# Patient Record
Sex: FEMALE | Race: WHITE | NOT HISPANIC OR LATINO | Employment: FULL TIME | URBAN - METROPOLITAN AREA
[De-identification: names, ages, dates, MRNs, and addresses within clinical notes are randomized per-mention and may not be internally consistent; named-entity substitution may affect disease eponyms.]

---

## 2024-01-08 ENCOUNTER — ANNUAL EXAM (OUTPATIENT)
Dept: OBGYN CLINIC | Facility: MEDICAL CENTER | Age: 24
End: 2024-01-08
Payer: COMMERCIAL

## 2024-01-08 VITALS
HEIGHT: 62 IN | BODY MASS INDEX: 37.91 KG/M2 | DIASTOLIC BLOOD PRESSURE: 70 MMHG | SYSTOLIC BLOOD PRESSURE: 110 MMHG | WEIGHT: 206 LBS

## 2024-01-08 DIAGNOSIS — Z36.89 ESTABLISH GESTATIONAL AGE, ULTRASOUND: ICD-10-CM

## 2024-01-08 DIAGNOSIS — Z3A.01 LESS THAN 8 WEEKS GESTATION OF PREGNANCY: ICD-10-CM

## 2024-01-08 DIAGNOSIS — N91.2 AMENORRHEA: Primary | ICD-10-CM

## 2024-01-08 PROBLEM — Z87.59 HISTORY OF GESTATIONAL HYPERTENSION: Status: ACTIVE | Noted: 2023-08-01

## 2024-01-08 PROCEDURE — 76817 TRANSVAGINAL US OBSTETRIC: CPT | Performed by: CLINICAL NURSE SPECIALIST

## 2024-01-08 PROCEDURE — 99213 OFFICE O/P EST LOW 20 MIN: CPT | Performed by: CLINICAL NURSE SPECIALIST

## 2024-01-08 NOTE — ASSESSMENT & PLAN NOTE
Unplanned pregnancy, but welcome  Short interval. Last delivery 10/14/23  Unknown LMP.  US today is showing a SLIUP measuring 6w2d.    Will repeat US in 2 wks to confirm EDC.  SAB precautions given

## 2024-01-08 NOTE — PROGRESS NOTES
"   Subjective  Patient ID: Sheeba Fung is a 23 y.o. female here for Gynecologic Exam (Pap 3/21/22 neg/Birth control/)  Pt originally scheduled for YV however when discussing her menstrual cycle she reports no menses since delivery in October.  Was breastfeeding but has stopped ~.  Has been having frequent unprotected sex and he doesn't always w/d.  She took a UPT which was positive around .  Visit changed to US visit.    She is still taking a prenatal vitamin    She is C/O amenorrhea  Signs and symptoms of pregnancy:   Breast tenderness: no  Fatigue: yes  Cramping or Pelvic Pain: no  Spotting or Vaginal Bleeding: no  Nausea or vomiting: no    OB History    Para Term  AB Living   4 2 2 0 2 2   SAB IAB Ectopic Multiple Live Births   1 0 0 0 2      # Outcome Date GA Lbr Jamie/2nd Weight Sex Delivery Anes PTL Lv   4 Term 10/14/23 37w4d / 00:16 3140 g (6 lb 14.8 oz) M Vag-Spont EPI N PELON   3 SAB 2022           2 Term 19 39w0d  3657 g (8 lb 1 oz) F Vag-Spont EPI N PELON      Complications: PROM (premature rupture of membranes)   1 2017                The following portions of the patient's history were reviewed and updated as appropriate: allergies, current medications, past family history, past medical history, past social history, past surgical history, and problem list.    Perinent hx that may affect pregnancy:  Short interval  Hx of GHTN      Review of Systems    See HPI for pertinent positives.             /70 (BP Location: Left arm, Patient Position: Sitting, Cuff Size: Standard)   Ht 5' 2\" (1.575 m)   Wt 93.4 kg (206 lb)   LMP  (LMP Unknown)   Breastfeeding No   BMI 37.68 kg/m²   OBGyn Exam      FIRST TRIMESTER OBSTETRIC ULTRASOUND     No LMP recorded (lmp unknown).    INDICATION: Establish Gestational Age; unknown LMP       FINDINGS:  See imaging report for details     Additional Findings: none     FHR: 116  IMPRESSION:    Single intrauterine pregnancy of 6 " weeks 2days gestational age  Fetal cardiac activity detected.  No adnexal masses seen.  EDC by LMP: n/a  EDC by this Ultrasound:  24    Assigning a Final GERARDO  Will be done by US, but presently too early to assign EDC. Will repeat US in 2 wks and officially confirm EDC then.    RONA Tsang   CENTER -[facility] [place]  [address]    [place] [address]  Dept: [phone]  Dept Fax: [phone]  Loc: [phone]  Loc Fax: [phone]  Ultrasound Probe Disinfection    A transvaginal ultrasound was performed.   Prior to use, disinfection was performed with High Level Disinfection Process ([employer]).  Probe serial number: [id number] was used.                    Assessment/Plan:      1. Amenorrhea  -     AMB US OB < 14 weeks single or first gestation level 1    2. Establish gestational age, ultrasound  -     AMB US OB < 14 weeks single or first gestation level 1    3. Less than 8 weeks gestation of pregnancy  Assessment & Plan:  Unplanned pregnancy, but welcome  Short interval. Last delivery 10/14/23  Unknown LMP.  US today is showing a SLIUP measuring 6w2d.    Will repeat US in 2 wks to confirm EDC.  SAB precautions given           Orders Placed This Encounter   Procedures    AMB US OB < 14 weeks single or first gestation level 1

## 2024-01-24 ENCOUNTER — ULTRASOUND (OUTPATIENT)
Dept: OBGYN CLINIC | Facility: MEDICAL CENTER | Age: 24
End: 2024-01-24
Payer: COMMERCIAL

## 2024-01-24 VITALS
BODY MASS INDEX: 38.64 KG/M2 | WEIGHT: 210 LBS | DIASTOLIC BLOOD PRESSURE: 80 MMHG | SYSTOLIC BLOOD PRESSURE: 114 MMHG | HEIGHT: 62 IN

## 2024-01-24 DIAGNOSIS — Z3A.08 8 WEEKS GESTATION OF PREGNANCY: ICD-10-CM

## 2024-01-24 DIAGNOSIS — N89.8 VAGINA ITCHING: ICD-10-CM

## 2024-01-24 DIAGNOSIS — O09.891 SHORT INTERVAL BETWEEN PREGNANCIES AFFECTING PREGNANCY IN FIRST TRIMESTER, ANTEPARTUM: ICD-10-CM

## 2024-01-24 DIAGNOSIS — Z87.59 HISTORY OF GESTATIONAL HYPERTENSION: ICD-10-CM

## 2024-01-24 DIAGNOSIS — Z36.89 ESTABLISH GESTATIONAL AGE, ULTRASOUND: Primary | ICD-10-CM

## 2024-01-24 LAB
BV WHIFF TEST VAG QL: NORMAL
CLUE CELLS SPEC QL WET PREP: NORMAL
PH SMN: 3.5 [PH]
SL AMB POCT WET MOUNT: NORMAL
T VAGINALIS VAG QL WET PREP: NORMAL
YEAST VAG QL WET PREP: NORMAL

## 2024-01-24 PROCEDURE — 87210 SMEAR WET MOUNT SALINE/INK: CPT | Performed by: CLINICAL NURSE SPECIALIST

## 2024-01-24 PROCEDURE — 76817 TRANSVAGINAL US OBSTETRIC: CPT | Performed by: CLINICAL NURSE SPECIALIST

## 2024-01-24 PROCEDURE — 99213 OFFICE O/P EST LOW 20 MIN: CPT | Performed by: CLINICAL NURSE SPECIALIST

## 2024-01-24 NOTE — PROGRESS NOTES
"   Subjective  Patient ID: Sheeba Kirkland is a 23 y.o. female here for Pregnancy Ultrasound (Repeat ultrasound /Patient said she feels good just tired and hunger /Patient is not breast feeding/Pregnancy is unplanned she is here with her partner today )    Newly Pregnant  No LMP recorded (lmp unknown). Patient is pregnant. Recent delivery 10/14/23- no menses since.  US previously done on 21 and was found to have a SLIUP at 6w2d, should be around 8w4d.     + c/o of that feeling hungry all the time- but no n/v  No cramping or bldg  + c/o intermittent vaginal itching    OB History    Para Term  AB Living   5 2 2 0 2 2   SAB IAB Ectopic Multiple Live Births   1 0 0 0 2      # Outcome Date GA Lbr Jamie/2nd Weight Sex Delivery Anes PTL Lv   5 Current            4 Term 10/14/23 37w4d / 00:16 3140 g (6 lb 14.8 oz) M Vag-Spont EPI N PELON   3 SAB 2022           2 Term 19 39w0d  3657 g (8 lb 1 oz) F Vag-Spont EPI N PELON      Complications: PROM (premature rupture of membranes)   1 AB                 The following portions of the patient's history were reviewed and updated as appropriate: allergies, current medications, past family history, past medical history, past social history, past surgical history, and problem list.    Perinent hx that may affect pregnancy:  Short interval pregnancy  Hx of GHTN       Review of Systems    See HPI for pertinent positives.             /80 (BP Location: Left arm, Patient Position: Sitting, Cuff Size: Standard)   Ht 5' 2\" (1.575 m)   Wt 95.3 kg (210 lb)   LMP  (LMP Unknown)   BMI 38.41 kg/m²   OBGyn Exam      FIRST TRIMESTER OBSTETRIC ULTRASOUND     No LMP recorded (lmp unknown). Patient is pregnant.    INDICATION: Establish Gestational Age       FINDINGS:  See imaging report for details     Additional Findings: none     FHR: 155  IMPRESSION:    Single intrauterine pregnancy of 8 weeks 6days gestational age  Fetal cardiac activity " detected.  No adnexal masses seen.  EDC by LMP: N/A  EDC by this Ultrasound: 24    Assigning a Final GERARDO  Please choose how you are assigning the GERARDO: The LMP is unknown or uncertain, therefore the final GERARDO will be based on today's ultrasound    Final GERARDO: 24 by ultrasound at this encounter.    RONA Tsang   CENTER -[facility] [place] [facility] [place]  [address]    [place] [address]  Dept: [phone]  Dept Fax: [phone]  Loc: [phone]  Loc Fax: [phone]  Ultrasound Probe Disinfection    A transvaginal ultrasound was performed.   Prior to use, disinfection was performed with High Level Disinfection Process ("[employer]on).  Probe serial number: [id number] was used.                    Assessment/Plan:         1. Establish gestational age, ultrasound  -     AMB US OB < 14 weeks single or first gestation level 1    2. 8 weeks gestation of pregnancy  Assessment & Plan:  She is a  with No LMP recorded (lmp unknown). Patient is pregnant. Ultrasound today is showing a viable IUP of 8w 6d.  Using EDC by US - 24.  Referral to [facility] given for routine US. F/U for OB intake and then Initial Prenatal Exam.        3. Short interval between pregnancies affecting pregnancy in first trimester, antepartum    4. History of gestational hypertension    5. Vagina itching  Comments:  wet mount normal. no yeast, BV or trich  Orders:  -     POCT wet mount        Orders Placed This Encounter   Procedures    POCT wet mount    AMB US OB < 14 weeks single or first gestation level 1

## 2024-01-24 NOTE — PATIENT INSTRUCTIONS
"Again, congratulations on your pregnancy!  NEXT STEPS  Get Prenatal bloodwork  Call MFM to schedule next ultrasound (done 12-13 wks)   Contact information for [facility] ([facility])- Main Number [phone]   Return to our office in 1-2 weeks for an OB intake and initial prenatal Exam(or sooner if any problems/concerns arise- see packet for things to report)  Check out [facility] website to read the \"Pregnancy Essentials Guide\" -this has lots of great early pregnancy information     It can be found by going to [email or web address]-->select services-->select women's health-->select Obstetrics  [email or web address]    Below is a variety of information that is useful in early pregnancy   Genetic screening can be very confusing for most people   We do recommend genetic screening universally for all pregnant women. Our goal is to have the most healthy uncomplicated pregnancy possible and this is one way to identify possible complications early.  Common disorders we can screen for include: Down Syndrome, Neural tube defects ( like spina bifida or gastroschisis) and trisomy 13, even possibly more  There are several options we will talk more about. Below is some information to get you started thinking about this.  We will discuss this more at the next appt.     GUIDE TO GENETIC TESTING    Aneuploidy Testing  Trisomy 21 (Down Syndrome), Trisomy 18, and Open Neural Tube Defects (Spina Bifida).  You may choose one of the following options: See below for CPT/Diagnostic codes  NIPT (Non-Invasive Prenatal Testing or Cell Free- Fetal DNA): This simple and accurate non-invasive prenatal screening blood test offers results for early risk assessment of Down syndrome (Trisomy 21), or Trisomy 18, trisomy 13 and other aneuploidy conditions.  The test also offers an optional analysis for fetal sex.  The test analyzes the relative amount of 21, 18, 13; X and Y chromosome " material in circulating cell-free DNA from a maternal blood sample.  This test can be performed at any time after 10 weeks gestation.  If you elect this test, you will also have an AFP (alpha-fetoprotein) blood test to test for open neural tube defects.  Recommended follow up to a positive result is genetic counseling and prenatal diagnosis.     Sequential Screening with Nuchal Translucency: This is a two-step test to detect whether a fetus is at increased risk for trisomy 21, trisomy 18, trisomy 13 and open neural tube defects.  The test has a narrow window for testing (the first step must be performed between 10 and 13 weeks gestation).  It includes two blood draws and an ultrasound.  The ultrasound measures the amount of fluid behind the baby’s neck called the nuchal translucency (NT).  The blood tests measures three different maternal hormone levels, -- pregnancy associated plasma protein (YUNIER-A), human chorionic gonadotrophin (hCG), and dimeric inhibin A (GARTH).  These measurements in combination with some maternal information such as height and weight are used to calculate whether the baby is at increased risk for Down Syndrome or Trisomy 18.  An alpha-fetoprotein test (AFP) is also included to test for open neural tube defects.  Recommended follow up to a positive result is additional testing that is more definitive, and referral for genetic counseling and prenatal diagnosis.    Quad Screen: This test is also known as the quadruple marker test.  It is a test that measures levels of four substances in a pregnant woman’s blood--Alpha-fetoprotein (AFP), a protein made by the developing baby; Human chorionic gonadotropin (hCG), a hormone made by the placenta; Estriol, a hormone made by the placenta and the baby’s liver; and Inhibin A, another hormone made by the placenta.  Typically, the quad screen is done between weeks 15 and 20 of pregnancy--the second trimester and the results indicate whether the baby is at  higher risk for Down Syndrome (Trisomy 21), Trisomy 18, and open neural tube defects.  This is a screening test.  Recommended follow up to a positive result is additional testing that is more definitive, as well as referral for genetic counseling and prenatal diagnosis.        Trisomy 21 Trisomy 18 Trisomy 13   NIPT  (FPR 0.1%) <99% <98% 99%   Sequential Screening (FPR 3.5%) 92% 90% N/A   Quad Screen   (FPR 5%) 83% 80% N/A   (FPR is False Positive Rate)       Additional Screening Tests Offered  Cystic Fibrosis: Cystic Fibrosis is the most common inherited disease of children and young adults.  The carrier frequency is 1 in 24, to 1 in 97.  Both parents need to be carriers for a child to be affected (25% chance).  One in 3500, children born are affected.  Cystic fibrosis is a disorder of mucus production and produces abnormally thick mucus leading to life threatening lung infections, digestion problems, poor growth, infertility, and more.  Symptoms range from mild to severe, but individuals with severe disease may die in childhood.  With treatments today, people with Cystic Fibrosis can live into their 30’s.  CF does not affect intelligence.  Recommended follow up to a positive result is testing of the baby’s father.  Spinal Muscular Atrophy (SMA): SMA is the most common inherited cause of early childhood death.  The carrier frequency is 1 in 47 to 1 in 72 in the [place] and both parents need to be carriers for a child to be affected (25% chance).  1 in 11,000 children are affected.  SMA is a progressive degeneration of lower motor neurons.  Muscle weakness is the most common type with respiratory failure by the age of 2 years old.  Muscles responsible for crawling, walking, swallowing, and head and neck control are most severely affected.  Recommended follow up to a positive result is testing of the baby’s father.      Fragile X Syndrome (the most common inherited cause of developmental delays): Fragile X syndrome is an  “X-linked” genetic disease, which means it is only carried by the mom.  Unfortunately, 1 in 250 females are carriers and a child has a 50% chance of being affected if this is the case.  1 in 4000 boys is affected with Fragile X and 1 in 8000 girls is affected.  Approximately 1/3 of all children born with Fragile X also have autism and hyperactivity.  Recommended follow up to a positive result is genetic counseling and prenatal diagnosis.      Guide To Insurance Coverage For Genetic Screening  Due to the complexity of coverage guidelines, we are unable to quote benefits or guarantee insurance coverage for any of these tests.  Insurance benefits are plan-specific and offer vastly different coverage based on your policy.  The handout attached explains the benefits to each test, and also provides the billing (CPT) codes for each test.  Even if the testing is covered, it could be applied to any unmet deductibles, and copays may apply, resulting in a bill.  You are encouraged to contact your insurance company to obtain your benefits based on your age and risk factors, so that there are no surprises.      Test Insurance Procedure (CPT) code   NIPT-cell free fetal DNA Most accurate non-invasive test- not always covered w/o risk factors 07321   Sequential Screen w/ NT US Current standard test-should be covered Part 1: (if lab is [facility]) 74535,36238   (If lab is [facility]) 58539  Part 2: (if lab is [facility])97138,11417,56767, 73941  (If lab is [facility]) 78909   Quad screen Old standard-use if past 1st trimester 03842, 43375, 96357, 92133   CF- Cystic Fibrosis  65793   SMA- Spinal Musc. Atrophy  61086   Fragile X  05401       Diagnosis code used Varies based on history- But will be one of these:  Encounter for  screening for other genetic defect Z36.8  Advanced Maternal Age (over 35) O09.529  Family History of Genetic Disease Carrier Z84.81    Please contact your insurance company with the appropriate CPT code from the  attached list, and diagnosis code applicable to your situation.    We ask that you review this information and decide what testing you would like to have performed.  Please note that the Sequential Screening with Nuchal Translucency has a smaller window of time to be performed during pregnancy (Prior to 14 wks).        Warning Signs During Pregnancy  The list below includes warning signs your providers would like you to be aware of.  If you experience any of these at any time during your pregnancy, please call us as soon as possible.     Vaginal bleeding   Sharp abdominal pain that does not go away   Fever (more than 100.4?F and is not relieved with Tylenol)   Persistent vomiting lasting greater than 24 hours   Chest pain   Pain or burning when you urinate   Severe headache that doesn’t resolve with Tylenol   Blurred vision or seeing spots in your vision   Sudden swelling of your face or hands   Redness, swelling or pain in a leg   A sudden weight gain in just a few days   Decrease in your baby’s movements (after 28 weeks or the 6th month of pregnancy)   A loss of watery fluid from your vagina - can be a gush, a trickle or  continuous wetness   After 20 weeks of pregnancy, rhythmic cramping (greater than 4 per hour)  or menstrual like low/pelvic pain    Call the OFFICE [phone] for any questions/emergencies.  At night or on the weekend, please indicate it is an emergency and the DOCTOR on call will be paged.    Discomforts of Early Pregnancy    Tips for coping with nausea and vomiting during pregnancy   Eat meals and snacks slowly   Eat every 1-2 hours to avoid a full stomach   Don’t skip meals, avoid empty stomach   Eat a snack prior to getting out of bed   Avoid food and beverages with a strong aroma   Avoid dehydration - drink enough fluid to keep the urine pale yellow   Drink fluids before a meal to minimize the effect of a full stomach   Limit the amount of coffee and beverages that contain caffeine    Eliminate spicy, odorous, high fat (fried foods), acidic (tomato products) and sweet foods   Fluids that contain lemon (lemonade), mint (tea) or orange can usually be well tolerated   Snacks and meals that contain low-fat protein (lean meats, fish, poultry and eggs) along with eating easily digestible carbs (fruit, rice, toast, crackers and dry cereal) may be tolerated better   Foods with ginger may be well tolerated. May use ginger root powder, capsules or extract (up to 1000 mg per day)   Drink liquids in small amounts    If symptoms persist, please contact your provider.      Tips for coping with constipation during pregnancy   Increase fiber and fluids.  - Drink 8-10 cups of liquid, like water or low-sugar juice daily  - Keep urine pale with fluids (water, milk), fruit and vegetables   Eat a well-balanced diet that contains high fiber food (fruits, vegetables, whole grain breads and cereals, bran and dried beans)   Take a 30-minute walk daily   You may take a mild stool softener such as Colace®    If symptoms persist, please contact your provider.      For any emergencies, PLEASE CALL THE OFFICE at [phone]. If the office is closed, the doctor on call will be paged by the answering service.    Medications and Pregnancy- see Pregnancy Essentials guide online- page 9    Expected Weight Gain During Pregnancy  If you have a healthy BMI (18-25) prior to pregnancy:  The recommended weight gain is between 25-35 pounds. Approximate weight gain  in the first trimester is 1-4.5 pounds. An expected weight gain during the second and  third trimester is approximately one pound per week.  If you have a BMI of less than 18 prior to pregnancy,  you are considered underweight:  The recommended weight gain is between 28-40 pounds. Approximate weight gain  in the first trimester is 1-4.5 pounds. An expected weight gain during the second and  third trimester is just over one pound per week.  If your BMI is 25 to 29.9: you are  considered overweight:  You should gain 1/2 to 2/3 pound during the second and third trimester, for a total  weight gain of 15 to 25 pounds.  If you have a BMI of greater than 30 prior to pregnancy,  you are considered overweight:  The recommended weight gain is between 15-25 pounds. Approximate weight gain  in the first trimester is 1-4.5 pounds. An expected weight gain during the second  and third trimester is approximately 0.5 pound per week.    Foods to avoid during pregnancy:   Unpasteurized milk, juice and cheese  - Soft cheeses like feta or brie (if made with UNPASTEURIZED milk)   Unheated deli meats like lunchmeat and hotdogs   Undercooked poultry, beef, pork, seafood including raw sushi    What fish is safe to eat during pregnancy?  Eat 8 to 12 ounces of fish a week. Pick from this group frequently, especially if you follow  the American Heart Association’s recommendation to eat fish at least 2 times a week.    AVOID HIGH MERCURY FISH  A single meal of the following fish can put  you over the Environmental Protection  Agency’s safe limit for the month.  High mercury fish:  Shark  Swordfish  Cristofer Mackerel  Tile Fish    Caffeine and Pregnancy    The March of D[employer] and American College of Obstetrics and Gynecologists (ACOG) urge pregnant  women to limit their caffeine consumption to no more than 200 milligrams (mg) per day. This is  comparable to having one 12-ounce cup of coffee a day. This level has been shown not to increase risk  of miscarriage, growth or  labor complications. Effects of higher levels are not known.    Exercise During Pregnancy  A daily exercise program that consists of 30 minutes a day is recommended.   Low impact exercises like walking and swimming are great exercises throughout  all of pregnancy   If you’re an avid strength  avoid lifting very heavy weights - nothing more  than 30 pounds    Drink plenty of fluids while exercising to stay hydrated.  Be careful to avoid  overheating.    ACTIVITIES TO AVOID   Exercises that can make you lose your balance.   Activities that can put your baby at risk i.e. horseback riding, scuba diving, skiing  or snowboarding. Any other sport that puts you at risk for getting hit in the  abdominal area.   Do not use saunas, steam rooms or hot tubs (that have a higher temperature  than 100F)   After the first trimester, avoid exercises that require you to lay flat on your back.   Avoid exceeding a heart rate greater than 140 beats per minute. As long as you are  able to hold a conversation while exercising your heart rate is likely acceptable

## 2024-01-24 NOTE — ASSESSMENT & PLAN NOTE
She is a  with No LMP recorded (lmp unknown). Patient is pregnant. Ultrasound today is showing a viable IUP of 8w 6d.  Using EDC by US - 24.  Referral to [facility] given for routine US. F/U for OB intake and then Initial Prenatal Exam.

## 2024-01-25 ENCOUNTER — TELEPHONE (OUTPATIENT)
Dept: PERINATAL CARE | Facility: OTHER | Age: 24
End: 2024-01-25

## 2024-01-25 NOTE — TELEPHONE ENCOUNTER
Called patient to schedule MFM appointment, based on referral issued to Maternal Fetal Medicine by OB office.  Left voicemail requesting patient to call back and schedule appointment, with office number for return call [phone].

## 2024-02-09 ENCOUNTER — INITIAL PRENATAL (OUTPATIENT)
Dept: OBGYN CLINIC | Facility: CLINIC | Age: 24
End: 2024-02-09

## 2024-02-09 ENCOUNTER — TELEPHONE (OUTPATIENT)
Dept: OBGYN CLINIC | Facility: CLINIC | Age: 24
End: 2024-02-09

## 2024-02-09 VITALS — WEIGHT: 210 LBS | BODY MASS INDEX: 38.64 KG/M2 | HEIGHT: 62 IN

## 2024-02-09 DIAGNOSIS — Z34.81 PRENATAL CARE, SUBSEQUENT PREGNANCY, FIRST TRIMESTER: Primary | ICD-10-CM

## 2024-02-09 PROCEDURE — OBC

## 2024-02-09 NOTE — PROGRESS NOTES
OB INTAKE INTERVIEW  Patient is 23 y.o.y.o. who presents for OB intake at 11 wks  She is accompanied by herself during this encounter  The father of her baby (Kodak Kirkland) is involved in the pregnancy and is 26 years old.      Last Menstrual Period: unknown delivered 10/2023- no menses   Ultrasound: Measured 8 weeks 6 days on 24  Estimated Date of Delivery: 24 changed by 8 week US    Signs/Symptoms of Pregnancy  Current pregnancy symptoms: feels great! Increased appetite  Constipation YES, no interventions needed  Headaches no  Cramping/spotting no  PICA cravings no    Diabetes-  Body mass index is 38.41 kg/m².  If patient has 1 or more, please order early 1 hour GTT  History of GDM no  BMI >35 YES  History of PCOS or current metformin use no  History of LGA/macrosomic infant (4000g/9lbs) no    If patient has 2 or more, please order early 1 hour GTT  BMI>30 YES  AMA no  First degree relative with type 2 diabetes no  History of chronic HTN, hyperlipidemia, elevated A1C no  High risk race (, , ,  or ) no    Hypertension- if you answer yes to any of the following, please order baseline preeclampsia labs (cbc, comprehensive metabolic panel, urine protein creatinine ratio, uric acid)  History of of chronic HTN no  History of gestational HTN YES  History of preeclampsia, eclampsia, or HELLP syndrome no  History of diabetes no  History of lupus, autoimmune disease, kidney disease no    Thyroid- if yes order TSH with reflex T4  History of thyroid disease no    Bleeding Disorder or Hx of DVT-patient or first degree relative with history of. Order the following if not done previously.   (Factor V, antithrombin III, prothrombin gene mutation, protein C and S Ag, lupus anticoagulant, anticardiolipin, beta-2 glycoprotein)   no    OB/GYN-  History of abnormal pap smear no       Date of last pap smear 3/21/22  History of HPV no  History of Herpes/HSV  no  History of other STI (gonorrhea, chlamydia, trich) no  History of prior  YES  History of prior  no  History of  delivery prior to 36 weeks 6 days no  History of blood transfusion no  Ok for blood transfusion yes    Substance screening-   History of tobacco use no  Currently using tobacco no  Substance Use Screen Level (N/A, LOW, HIGH) NA    MRSA Screening-   Does the pt have a hx of MRSA? no    Immunizations:  Influenza vaccine given this season no  Discussed Tdap vaccine yes  Discussed COVID Vaccine no    Genetic/MFM-  Do you or your partner have a history of any of the following in yourselves or first degree relatives?  Cystic fibrosis no  Spinal muscular atrophy no  Hemoglobinopathy/Sickle Cell/Thalassemia no  Fragile X Intellectual Disability no    If yes, discuss Carrier Screening and recommend consultation with M/Genetic Counseling and place specific [facility] Referral for.    If no, discuss Carrier Screening being completed once in a lifetime as a standard of care lab test. Place orders for Cystic Fibrosis Gene Test (CEX088) and Spinal Muscular Atrophy DNA ([id number])      Appointment for Nuchal Translucency Ultrasound at [facility] scheduled for       Interview education  [facility] Pregnancy Essentials Book reviewed, discussed and attached to their AVS yes    Nurse/Family Partnership- patient may qualify no; referral placed no    Prenatal lab work scripts yes  Extra labs ordered:  1 HR gtt  preE labs    Aspirin/Preeclampsia Screen    Risk Level Risk Factor Recommendation   LOW Prior Uncomplicated full-term delivery YES No Aspirin recommendation        MODERATE Nulliparity no Recommend low-dose aspirin if     BMI>30 YES 2 or more moderate risk factors    Family History Preeclampsia (mother/sister) no     35yr old or greater no      or Low Socioeconomic no     IVF Pregnancy  no     Personal History Risks (low birth weight, prior adverse preg outcome, >10yr preg interval) YES gHTN  with 2023 delivery        HIGH History of Preeclampsia no Recommend low-dose aspirin if     Multifetal gestation no 1 or more high risk factors    Chronic HTN no     Type 1 or 2 Diabetes no     Renal Disease no     Autoimmune Disease  no      Contraindications to ASA therapy:  NSAID/ ASA allergy: no  Nasal polyps: no  Asthma with history of ASA induced bronchospasm: no  Relative contraindications:  History of GI bleed: no  Active peptic ulcer disease: no  Severe hepatic dysfunction: no    Patient should be recommended to take ASA 162mg during this pregnancy from 12-36wks to lower her risk of preeclampsia: meets moderate risk criteria - patient aware of ASA therapy      The patient has a history now or in prior pregnancy notable for:  gestational HTN and Short interval pregnancy- delivered  Oct 2023      Details that I feel the provider should be aware of: This was a unplanned but welcomed pregnancy for Sheeba and Kodak, they had just delivered their 2nd child with us, Kvng in October of 2023. She had a 37w delivery due to developing gHTN. She has recently moved to [place] and plans on keeping her OB care here in [place]. She is aware of the distance between where she lives and [place] if it becomes to much she will consider transferring care to an OB in [place].  She and Kodak were just  in November. She is aware of 1 hr gtt testing, preE baseline labs, and pn1 labs. CF/SMA was not ordered as she believes there were done in 2019 with her 1st pregnancy. She will have records faxed from OB in [place], no records available via care everywhere.     PN1 visit scheduled. The patient was oriented to our practice, the navigator role, reviewed delivering physicians and [facility] for Delivery. All questions were answered.    Interviewed by: Jazmyn Kumar RN

## 2024-02-09 NOTE — TELEPHONE ENCOUNTER
Left message for patient regarding OB intake appointment. Direct line given to call back within 15 min to continue with intake appointment today, the office line was given if this appointment time no longer works and she needs it to be rescheduled.  LOVE sent to .

## 2024-02-09 NOTE — PATIENT INSTRUCTIONS
Congratulations!! Please review our Pregnancy Essential Guide and [facility] L&D Virtual tour from our networks website.     [facility] Pregnancy Essentials Guide  [facility] Women's Health ([email or web address])     [facility] - Virtual Tour ([facility])     Click for Lab Locations: [facility] ([email or web address])

## 2024-02-19 ENCOUNTER — TELEPHONE (OUTPATIENT)
Facility: HOSPITAL | Age: 24
End: 2024-02-19

## 2024-02-19 ENCOUNTER — ROUTINE PRENATAL (OUTPATIENT)
Facility: HOSPITAL | Age: 24
End: 2024-02-19
Payer: COMMERCIAL

## 2024-02-19 ENCOUNTER — APPOINTMENT (OUTPATIENT)
Dept: LAB | Facility: CLINIC | Age: 24
End: 2024-02-19
Payer: COMMERCIAL

## 2024-02-19 VITALS
WEIGHT: 217.8 LBS | BODY MASS INDEX: 41.12 KG/M2 | DIASTOLIC BLOOD PRESSURE: 80 MMHG | SYSTOLIC BLOOD PRESSURE: 130 MMHG | HEIGHT: 61 IN | HEART RATE: 76 BPM

## 2024-02-19 DIAGNOSIS — Z36.82 ENCOUNTER FOR ANTENATAL SCREENING FOR NUCHAL TRANSLUCENCY: Primary | ICD-10-CM

## 2024-02-19 DIAGNOSIS — Z3A.12 12 WEEKS GESTATION OF PREGNANCY: ICD-10-CM

## 2024-02-19 DIAGNOSIS — Z87.59 HISTORY OF GESTATIONAL HYPERTENSION: ICD-10-CM

## 2024-02-19 DIAGNOSIS — Z34.81 PRENATAL CARE, SUBSEQUENT PREGNANCY, FIRST TRIMESTER: ICD-10-CM

## 2024-02-19 DIAGNOSIS — O99.211 MATERNAL OBESITY, ANTEPARTUM, FIRST TRIMESTER: ICD-10-CM

## 2024-02-19 DIAGNOSIS — O09.891 SHORT INTERVAL BETWEEN PREGNANCIES AFFECTING PREGNANCY IN FIRST TRIMESTER, ANTEPARTUM: ICD-10-CM

## 2024-02-19 DIAGNOSIS — Z36.9 UNSPECIFIED ANTENATAL SCREENING: ICD-10-CM

## 2024-02-19 DIAGNOSIS — Z33.1 PREGNANT STATE, INCIDENTAL: ICD-10-CM

## 2024-02-19 LAB
ABO GROUP BLD: NORMAL
ALBUMIN SERPL BCP-MCNC: 4.2 G/DL (ref 3.5–5)
ALP SERPL-CCNC: 48 U/L (ref 34–104)
ALT SERPL W P-5'-P-CCNC: 11 U/L (ref 7–52)
ANION GAP SERPL CALCULATED.3IONS-SCNC: 5 MMOL/L
AST SERPL W P-5'-P-CCNC: 12 U/L (ref 13–39)
BASOPHILS # BLD AUTO: 0 THOUSANDS/ÂΜL (ref 0–0.1)
BASOPHILS NFR BLD AUTO: 0 % (ref 0–1)
BILIRUB SERPL-MCNC: 0.32 MG/DL (ref 0.2–1)
BLD GP AB SCN SERPL QL: NEGATIVE
BUN SERPL-MCNC: 9 MG/DL (ref 5–25)
CALCIUM SERPL-MCNC: 9.5 MG/DL (ref 8.4–10.2)
CHLORIDE SERPL-SCNC: 105 MMOL/L (ref 96–108)
CO2 SERPL-SCNC: 27 MMOL/L (ref 21–32)
CREAT SERPL-MCNC: 0.46 MG/DL (ref 0.6–1.3)
EOSINOPHIL # BLD AUTO: 0.02 THOUSAND/ÂΜL (ref 0–0.61)
EOSINOPHIL NFR BLD AUTO: 0 % (ref 0–6)
ERYTHROCYTE [DISTWIDTH] IN BLOOD BY AUTOMATED COUNT: 12.5 % (ref 11.6–15.1)
GFR SERPL CREATININE-BSD FRML MDRD: 141 ML/MIN/1.73SQ M
GLUCOSE SERPL-MCNC: 62 MG/DL (ref 65–140)
HBV SURFACE AG SER QL: NORMAL
HCT VFR BLD AUTO: 39.8 % (ref 34.8–46.1)
HCV AB SER QL: NORMAL
HGB BLD-MCNC: 13.6 G/DL (ref 11.5–15.4)
HIV 1+2 AB+HIV1 P24 AG SERPL QL IA: NORMAL
HIV 2 AB SERPL QL IA: NORMAL
HIV1 AB SERPL QL IA: NORMAL
HIV1 P24 AG SERPL QL IA: NORMAL
IMM GRANULOCYTES # BLD AUTO: 0.02 THOUSAND/UL (ref 0–0.2)
IMM GRANULOCYTES NFR BLD AUTO: 0 % (ref 0–2)
LYMPHOCYTES # BLD AUTO: 2.54 THOUSANDS/ÂΜL (ref 0.6–4.47)
LYMPHOCYTES NFR BLD AUTO: 35 % (ref 14–44)
MCH RBC QN AUTO: 30.4 PG (ref 26.8–34.3)
MCHC RBC AUTO-ENTMCNC: 34.2 G/DL (ref 31.4–37.4)
MCV RBC AUTO: 89 FL (ref 82–98)
MONOCYTES # BLD AUTO: 0.79 THOUSAND/ÂΜL (ref 0.17–1.22)
MONOCYTES NFR BLD AUTO: 11 % (ref 4–12)
NEUTROPHILS # BLD AUTO: 3.91 THOUSANDS/ÂΜL (ref 1.85–7.62)
NEUTS SEG NFR BLD AUTO: 54 % (ref 43–75)
NRBC BLD AUTO-RTO: 0 /100 WBCS
PLATELET # BLD AUTO: 259 THOUSANDS/UL (ref 149–390)
PMV BLD AUTO: 9.9 FL (ref 8.9–12.7)
POTASSIUM SERPL-SCNC: 3.5 MMOL/L (ref 3.5–5.3)
PROT SERPL-MCNC: 7.3 G/DL (ref 6.4–8.4)
RBC # BLD AUTO: 4.48 MILLION/UL (ref 3.81–5.12)
RH BLD: POSITIVE
RUBV IGG SERPL IA-ACNC: 27.4 IU/ML
SODIUM SERPL-SCNC: 137 MMOL/L (ref 135–147)
TREPONEMA PALLIDUM IGG+IGM AB [PRESENCE] IN SERUM OR PLASMA BY IMMUNOASSAY: NORMAL
URATE SERPL-MCNC: 4.8 MG/DL (ref 2–7.5)
WBC # BLD AUTO: 7.28 THOUSAND/UL (ref 4.31–10.16)

## 2024-02-19 PROCEDURE — 86762 RUBELLA ANTIBODY: CPT

## 2024-02-19 PROCEDURE — 80053 COMPREHEN METABOLIC PANEL: CPT

## 2024-02-19 PROCEDURE — 86780 TREPONEMA PALLIDUM: CPT

## 2024-02-19 PROCEDURE — 85025 COMPLETE CBC W/AUTO DIFF WBC: CPT

## 2024-02-19 PROCEDURE — 76813 OB US NUCHAL MEAS 1 GEST: CPT | Performed by: OBSTETRICS & GYNECOLOGY

## 2024-02-19 PROCEDURE — 87340 HEPATITIS B SURFACE AG IA: CPT

## 2024-02-19 PROCEDURE — 86803 HEPATITIS C AB TEST: CPT

## 2024-02-19 PROCEDURE — 36415 COLL VENOUS BLD VENIPUNCTURE: CPT

## 2024-02-19 PROCEDURE — 86900 BLOOD TYPING SEROLOGIC ABO: CPT

## 2024-02-19 PROCEDURE — 86901 BLOOD TYPING SEROLOGIC RH(D): CPT

## 2024-02-19 PROCEDURE — 87389 HIV-1 AG W/HIV-1&-2 AB AG IA: CPT

## 2024-02-19 PROCEDURE — 86850 RBC ANTIBODY SCREEN: CPT

## 2024-02-19 PROCEDURE — 76801 OB US < 14 WKS SINGLE FETUS: CPT | Performed by: OBSTETRICS & GYNECOLOGY

## 2024-02-19 PROCEDURE — 99214 OFFICE O/P EST MOD 30 MIN: CPT | Performed by: OBSTETRICS & GYNECOLOGY

## 2024-02-19 PROCEDURE — 84550 ASSAY OF BLOOD/URIC ACID: CPT

## 2024-02-19 RX ORDER — ASPIRIN 81 MG/1
162 TABLET, CHEWABLE ORAL
Qty: 180 TABLET | Refills: 1 | Status: SHIPPED | OUTPATIENT
Start: 2024-02-19 | End: 2024-05-19

## 2024-02-19 NOTE — LETTER
February 19, 2024     Sindy Lopez DO  [address] [facility] [address]    Patient: Sheeba Kirkland   YOB: 2000   Date of Visit: 2/19/2024       Dear Dr. Lopez:    Thank you for referring Sheeba Kirkland to me for evaluation. Below are my notes for this consultation.    If you have questions, please do not hesitate to call me. I look forward to following your patient along with you.         Sincerely,        Rosendo Ty MD        CC: No Recipients    Rosendo Ty MD  2/19/2024 10:25 AM  Sign when Signing Visit  Please refer to the [facility] ultrasound report in Ob Procedures for additional information regarding today's visit   PCP for Routine Care

## 2024-02-19 NOTE — PROGRESS NOTES
Patient chose to have [facility] [id number] Non-Invasive Prenatal Screen [id number] PLUS w/ SCA, WITH fetal sex.  Patient choose billed through insurance.     Patient given brochure and is aware [facility] will contact patient's insurance and coordinate coverage.  Provided [facility] contact information. General inquiries [phone], Cost estimates [phone] and [facility] Billing [phone]. Website [email or web address] [place].     Blood collection tubes labeled with patient identifiers (name, medical record number, and date of birth).     Filled out [facility] order form. Patient chose to be given a test kit. Patient instructed to take to a [facility] lab for blood collection..   If patient had blood work in office: Blood drawn from sent to lab for blood work. . Needle used sent to lab for blood work. .   If patient chose to have blood work drawn at a [facility] lab we requested patient notify MFM (via phone call or [employer] message) when blood collected so office can follow up on results.     Copy of lab order scanned to Epic media.     Maternal Fetal Medicine will have results in approximately 5-7 business days and will call patient or notify via [employer].  Patient aware viewing lab result online will reveal fetal sex if ordered.    Patient verbalized understanding of all instructions and no questions at this time.

## 2024-02-19 NOTE — PROGRESS NOTES
"Prenatal Visit  Subjective:   Sheeba is a 23 y.o. female.  She is a  here for initial PN visit/New OB exam    She is reporting the following pregnancy related complaints:  None- feels well  Denies Cramping or VB  Denies abnormal d/c or urinary complaints  Denies N/V    The following portions of the patient's history were reviewed and updated as appropriate: allergies, current medications, past family history, past medical history, past social history, past surgical history, and problem list.    Genetic Family hx:   unremarkable    Diabetes risk Factors:   Elevated BMI      Hypertension Risk Factors:   Pertinent positive: hx of GHTN and BMI pre-preg 39      Objective:  No LMP recorded (lmp unknown). Patient is pregnant.  /80 (BP Location: Left arm, Patient Position: Sitting, Cuff Size: Large)   Pulse (!) 107   Ht 5' 1\" (1.549 m)   Wt 98.4 kg (217 lb)   LMP  (LMP Unknown)   BMI 41.00 kg/m²   Pregravid Weight/BMI: 95.3 kg (210 lb) (BMI 39.70)    OBGyn Exam- see prenatal physical form    Assessment & Plan:      1. Other obesity due to excess calories affecting pregnancy in first trimester  Assessment & Plan:  Pre-gest BMI 39.7  Early glucola ordered, but not yet completed- planning to do in the next few days  Growth US 3rd trimester  Wkly a/n testing 37 wks      2. Prenatal care, subsequent pregnancy, first trimester  -     POCT urine dip    3. History of gestational hypertension  Assessment & Plan:  Prior pregnancy with GHTN  Baseline labs ordered but not yet completed.  2moderate risk factors for pre-e (BMI and hx of GHTN) discussed increased risk for Pre-Eclampsia and the role of low dose aspirin (162 mg) for decreasing risk of pre term pre-eclampsia. Recommended 162 mg daily from 12 wks - 36 wks. She is planning to  Low Dose Aspirin today/tomorrow        4. Short interval between pregnancies affecting pregnancy in first trimester, antepartum  Assessment & Plan:  Recent delivery in October " '23.   Has been counseled on increased risks or PTD, growth restriction.  Plan for growth US in 3rd trimester      5. 12 weeks gestation of pregnancy  Assessment & Plan:  She is a  at 12w5d   New OB Exam completed -and WNL   Pap is not indicated and gonorrhea/chlamydia cultures collected.  See other A&P for risk factors/identified  Genetic screening/testing: average risk NIPT     I have reviewed the maternal as well as infant benefits of breastfeeding with the patient.  The patient currently plans to breastfeed.     I reviewed the reasons to call in the first trimester - namely vaginal bleeding, severe nausea and vomiting, severe pelvic pain, fevers or pain/burning with urination.    Reviewed indication for vaccines in Pregnancy: flu, Covid and RSV.       6. Screen for STD (sexually transmitted disease)  -     Chlamydia/GC amplified DNA by PCR          RONA Tsang  2024

## 2024-02-19 NOTE — TELEPHONE ENCOUNTER
Called PT to schedule anatomy scan as she was not able to schedule in office today. PT asked if she could give us a call back and verbalized understanding that we would wait to hear from her.

## 2024-02-19 NOTE — PROGRESS NOTES
Please refer to the [facility] ultrasound report in Ob Procedures for additional information regarding today's visit

## 2024-02-21 ENCOUNTER — INITIAL PRENATAL (OUTPATIENT)
Dept: OBGYN CLINIC | Facility: MEDICAL CENTER | Age: 24
End: 2024-02-21
Payer: COMMERCIAL

## 2024-02-21 ENCOUNTER — TELEPHONE (OUTPATIENT)
Age: 24
End: 2024-02-21

## 2024-02-21 ENCOUNTER — APPOINTMENT (OUTPATIENT)
Dept: LAB | Facility: MEDICAL CENTER | Age: 24
End: 2024-02-21
Payer: COMMERCIAL

## 2024-02-21 VITALS
DIASTOLIC BLOOD PRESSURE: 80 MMHG | HEIGHT: 61 IN | BODY MASS INDEX: 40.97 KG/M2 | WEIGHT: 217 LBS | HEART RATE: 107 BPM | SYSTOLIC BLOOD PRESSURE: 130 MMHG

## 2024-02-21 DIAGNOSIS — Z87.59 HISTORY OF GESTATIONAL HYPERTENSION: ICD-10-CM

## 2024-02-21 DIAGNOSIS — O99.211 OTHER OBESITY DUE TO EXCESS CALORIES AFFECTING PREGNANCY IN FIRST TRIMESTER: Primary | ICD-10-CM

## 2024-02-21 DIAGNOSIS — Z11.3 SCREEN FOR STD (SEXUALLY TRANSMITTED DISEASE): ICD-10-CM

## 2024-02-21 DIAGNOSIS — Z3A.12 12 WEEKS GESTATION OF PREGNANCY: ICD-10-CM

## 2024-02-21 DIAGNOSIS — E66.09 OTHER OBESITY DUE TO EXCESS CALORIES AFFECTING PREGNANCY IN FIRST TRIMESTER: Primary | ICD-10-CM

## 2024-02-21 DIAGNOSIS — Z34.81 PRENATAL CARE, SUBSEQUENT PREGNANCY, FIRST TRIMESTER: ICD-10-CM

## 2024-02-21 DIAGNOSIS — O09.891 SHORT INTERVAL BETWEEN PREGNANCIES AFFECTING PREGNANCY IN FIRST TRIMESTER, ANTEPARTUM: ICD-10-CM

## 2024-02-21 PROBLEM — R42 DIZZINESS: Status: RESOLVED | Noted: 2023-08-10 | Resolved: 2024-02-21

## 2024-02-21 LAB
BACTERIA UR QL AUTO: NORMAL /HPF
BILIRUB UR QL STRIP: NEGATIVE
CLARITY UR: CLEAR
COLOR UR: COLORLESS
GLUCOSE 1H P 50 G GLC PO SERPL-MCNC: 97 MG/DL (ref 40–134)
GLUCOSE UR STRIP-MCNC: NEGATIVE MG/DL
HGB UR QL STRIP.AUTO: NEGATIVE
KETONES UR STRIP-MCNC: NEGATIVE MG/DL
LEUKOCYTE ESTERASE UR QL STRIP: NEGATIVE
NITRITE UR QL STRIP: NEGATIVE
NON-SQ EPI CELLS URNS QL MICRO: NORMAL /HPF
PH UR STRIP.AUTO: 6.5 [PH]
PROT UR STRIP-MCNC: NEGATIVE MG/DL
RBC #/AREA URNS AUTO: NORMAL /HPF
SL AMB  POCT GLUCOSE, UA: ABNORMAL
SL AMB POCT URINE PROTEIN: ABNORMAL
SP GR UR STRIP.AUTO: 1.01 (ref 1–1.03)
UROBILINOGEN UR STRIP-ACNC: <2 MG/DL
WBC #/AREA URNS AUTO: NORMAL /HPF

## 2024-02-21 PROCEDURE — 87591 N.GONORRHOEAE DNA AMP PROB: CPT | Performed by: CLINICAL NURSE SPECIALIST

## 2024-02-21 PROCEDURE — 36415 COLL VENOUS BLD VENIPUNCTURE: CPT

## 2024-02-21 PROCEDURE — 84156 ASSAY OF PROTEIN URINE: CPT

## 2024-02-21 PROCEDURE — 81001 URINALYSIS AUTO W/SCOPE: CPT

## 2024-02-21 PROCEDURE — 82570 ASSAY OF URINE CREATININE: CPT

## 2024-02-21 PROCEDURE — PNV: Performed by: CLINICAL NURSE SPECIALIST

## 2024-02-21 PROCEDURE — 81002 URINALYSIS NONAUTO W/O SCOPE: CPT | Performed by: CLINICAL NURSE SPECIALIST

## 2024-02-21 PROCEDURE — 82950 GLUCOSE TEST: CPT

## 2024-02-21 PROCEDURE — 87491 CHLMYD TRACH DNA AMP PROBE: CPT | Performed by: CLINICAL NURSE SPECIALIST

## 2024-02-21 PROCEDURE — 87086 URINE CULTURE/COLONY COUNT: CPT

## 2024-02-21 NOTE — ASSESSMENT & PLAN NOTE
Prior pregnancy with GHTN  Baseline labs ordered but not yet completed.  2moderate risk factors for pre-e (BMI and hx of GHTN) discussed increased risk for Pre-Eclampsia and the role of low dose aspirin (162 mg) for decreasing risk of pre term pre-eclampsia. Recommended 162 mg daily from 12 wks - 36 wks. She is planning to  Low Dose Aspirin today/tomorrow

## 2024-02-21 NOTE — ASSESSMENT & PLAN NOTE
She is a  at 12w5d   New OB Exam completed -and WNL   Pap is not indicated and gonorrhea/chlamydia cultures collected.  See other A&P for risk factors/identified  Genetic screening/testing: average risk NIPT     I have reviewed the maternal as well as infant benefits of breastfeeding with the patient.  The patient currently plans to breastfeed.     I reviewed the reasons to call in the first trimester - namely vaginal bleeding, severe nausea and vomiting, severe pelvic pain, fevers or pain/burning with urination.    Reviewed indication for vaccines in Pregnancy: flu, Covid and RSV.

## 2024-02-21 NOTE — ASSESSMENT & PLAN NOTE
Recent delivery in October '23.   Has been counseled on increased risks or PTD, growth restriction.  Plan for growth US in 3rd trimester

## 2024-02-21 NOTE — TELEPHONE ENCOUNTER
Left voice message to patient if she sees no result for anemia panel it was not needed. No to be concerned.

## 2024-02-21 NOTE — TELEPHONE ENCOUNTER
"Anemia panel not needed- was already \" done\"  with prenatal panel and reflex wasn't warranted b/c h/h not low. "

## 2024-02-21 NOTE — TELEPHONE ENCOUNTER
Received call from Lori with [facility]. States she is to draw an anemia panel but also needs an order for a CBC with diff. Routed to office.

## 2024-02-21 NOTE — ASSESSMENT & PLAN NOTE
Pre-gest BMI 39.7  Early glucola ordered, but not yet completed- planning to do in the next few days  Growth US 3rd trimester  Wkly a/n testing 37 wks

## 2024-02-22 ENCOUNTER — TELEPHONE (OUTPATIENT)
Dept: LAB | Facility: HOSPITAL | Age: 24
End: 2024-02-22

## 2024-02-22 LAB
C TRACH DNA SPEC QL NAA+PROBE: NEGATIVE
CREAT UR-MCNC: 21.4 MG/DL
N GONORRHOEA DNA SPEC QL NAA+PROBE: NEGATIVE
PROT UR-MCNC: <4 MG/DL

## 2024-02-22 NOTE — TELEPHONE ENCOUNTER
Regarding: IBC from lab.  ----- Message from Michael Quezada sent at 2/22/2024  9:02 AM EST -----  IBC from lab stating that the amenia panel with obs is needed and cbc needs to be re-ordered.   Has the specimen but needs the order.   Req cb when ordered/follow up at [phone]

## 2024-02-22 NOTE — TELEPHONE ENCOUNTER
Hi my name is Madai Alissa I'm reaching out to you concerning this patient . To do an anemia panel ob it needs to be done with a cbc. We have the tubes to process for you can you please order a cbc and reorder the anemia panel for us to process for this patient thank you Madai

## 2024-02-23 LAB — BACTERIA UR CULT: NORMAL

## 2024-02-25 LAB
CFDNA.FET/CFDNA.TOTAL SFR FETUS: NORMAL %
CFDNA.FET/CFDNA.TOTAL SFR FETUS: NORMAL %
CITATION REF LAB TEST: NORMAL
FET 13+18+21+X+Y ANEUP PLAS.CFDNA: NEGATIVE
FET CHR 21 TS PLAS.CFDNA QL: NEGATIVE
FET CHR 21 TS PLAS.CFDNA QL: NEGATIVE
FET MS X RISK WBC.DNA+CFDNA QL: NOT DETECTED
FET SEX PLAS.CFDNA DOSAGE CFDNA: NORMAL
FET TS 13 RISK PLAS.CFDNA QL: NEGATIVE
FET X + Y ANEUP RISK PLAS.CFDNA SEQ-IMP: NOT DETECTED
GESTATION: NORMAL
GESTATIONAL AGE > 9:: YES
LAB DIRECTOR NAME PROVIDER: NORMAL
LABORATORY COMMENT REPORT: NORMAL
LIMITATIONS OF THE TEST: NORMAL
NEGATIVE PREDICTIVE VALUE: NORMAL
PERFORMANCE CHARACTERISTICS: NORMAL
POSITIVE PREDICTIVE VALUE: NORMAL
REF LAB TEST METHOD: NORMAL
SL AMB NOTE:: NORMAL
TEST PERFORMANCE INFO SPEC: NORMAL

## 2024-02-27 ENCOUNTER — TELEPHONE (OUTPATIENT)
Age: 24
End: 2024-02-27

## 2024-02-27 NOTE — TELEPHONE ENCOUNTER
----- Message from Rosendo Ty MD sent at 2/26/2024  2:52 PM EST -----  Regarding: NIPT  The NIPT result was reviewed by me.  The result reveals low risk for trisomies 21, 18, 13, and sex chromosome aneuploidies.  ----- Message -----  From: [facility] Results In  Sent: 2/26/2024   2:49 PM EST  To: Rosendo Ty MD

## 2024-02-27 NOTE — TELEPHONE ENCOUNTER
Incoming call from patient to review NIPT results. Negative screening reviewed with patient. Patient verbalized understanding.

## 2024-05-29 ENCOUNTER — TELEPHONE (OUTPATIENT)
Age: 24
End: 2024-05-29

## 2024-05-29 NOTE — TELEPHONE ENCOUNTER
Yeni from [facility] [place] called stating that she receive the patient's medical records disc from us but for some reason could not open it. She is requesting that we fax over the patient's carrier/genetic screen and also her last pap.     Fax# [phone]  Phone# [phone]

## 2024-05-31 NOTE — TELEPHONE ENCOUNTER
[place] [facility] [place] will be mailed out today .Pap and genetic screen was faxed to [phone].